# Patient Record
Sex: MALE | Race: BLACK OR AFRICAN AMERICAN | Employment: FULL TIME | URBAN - METROPOLITAN AREA
[De-identification: names, ages, dates, MRNs, and addresses within clinical notes are randomized per-mention and may not be internally consistent; named-entity substitution may affect disease eponyms.]

---

## 2018-01-23 ENCOUNTER — APPOINTMENT (OUTPATIENT)
Dept: GENERAL RADIOLOGY | Age: 28
End: 2018-01-23
Attending: PHYSICIAN ASSISTANT
Payer: COMMERCIAL

## 2018-01-23 ENCOUNTER — APPOINTMENT (OUTPATIENT)
Dept: ULTRASOUND IMAGING | Age: 28
End: 2018-01-23
Attending: PHYSICIAN ASSISTANT
Payer: COMMERCIAL

## 2018-01-23 ENCOUNTER — HOSPITAL ENCOUNTER (EMERGENCY)
Age: 28
Discharge: HOME | End: 2018-01-23
Attending: INTERNAL MEDICINE
Payer: COMMERCIAL

## 2018-01-23 ENCOUNTER — APPOINTMENT (OUTPATIENT)
Dept: CT IMAGING | Age: 28
End: 2018-01-23
Attending: INTERNAL MEDICINE
Payer: COMMERCIAL

## 2018-01-23 VITALS
OXYGEN SATURATION: 100 % | WEIGHT: 227 LBS | SYSTOLIC BLOOD PRESSURE: 121 MMHG | DIASTOLIC BLOOD PRESSURE: 71 MMHG | RESPIRATION RATE: 16 BRPM | TEMPERATURE: 98.3 F | HEIGHT: 69 IN | HEART RATE: 84 BPM | BODY MASS INDEX: 33.62 KG/M2

## 2018-01-23 DIAGNOSIS — R79.89 ELEVATED LFTS: ICD-10-CM

## 2018-01-23 DIAGNOSIS — R51.9 NONINTRACTABLE HEADACHE, UNSPECIFIED CHRONICITY PATTERN, UNSPECIFIED HEADACHE TYPE: Primary | ICD-10-CM

## 2018-01-23 DIAGNOSIS — R50.9 FEVER, UNSPECIFIED FEVER CAUSE: ICD-10-CM

## 2018-01-23 LAB
ALBUMIN SERPL-MCNC: 3.8 G/DL (ref 3.4–5)
ALBUMIN/GLOB SERPL: 0.8 {RATIO} (ref 0.8–1.7)
ALP SERPL-CCNC: 129 U/L (ref 45–117)
ALT SERPL-CCNC: 112 U/L (ref 16–61)
ANION GAP SERPL CALC-SCNC: 12 MMOL/L (ref 3–18)
APPEARANCE UR: CLEAR
AST SERPL-CCNC: 73 U/L (ref 15–37)
ATRIAL RATE: 108 BPM
BASOPHILS # BLD: 0.1 K/UL (ref 0–0.06)
BASOPHILS NFR BLD: 1 % (ref 0–2)
BILIRUB SERPL-MCNC: 0.7 MG/DL (ref 0.2–1)
BILIRUB UR QL: NEGATIVE
BUN SERPL-MCNC: 9 MG/DL (ref 7–18)
BUN/CREAT SERPL: 6 (ref 12–20)
CALCIUM SERPL-MCNC: 9.6 MG/DL (ref 8.5–10.1)
CALCULATED P AXIS, ECG09: 36 DEGREES
CALCULATED R AXIS, ECG10: 9 DEGREES
CALCULATED T AXIS, ECG11: -2 DEGREES
CHLORIDE SERPL-SCNC: 101 MMOL/L (ref 100–108)
CO2 SERPL-SCNC: 28 MMOL/L (ref 21–32)
COLOR UR: YELLOW
CREAT SERPL-MCNC: 1.48 MG/DL (ref 0.6–1.3)
DIAGNOSIS, 93000: NORMAL
DIFFERENTIAL METHOD BLD: ABNORMAL
EOSINOPHIL # BLD: 0.1 K/UL (ref 0–0.4)
EOSINOPHIL NFR BLD: 1 % (ref 0–5)
ERYTHROCYTE [DISTWIDTH] IN BLOOD BY AUTOMATED COUNT: 13.6 % (ref 11.6–14.5)
FLUAV AG NPH QL IA: NEGATIVE
FLUBV AG NOSE QL IA: NEGATIVE
GLOBULIN SER CALC-MCNC: 4.5 G/DL (ref 2–4)
GLUCOSE SERPL-MCNC: 104 MG/DL (ref 74–99)
GLUCOSE UR STRIP.AUTO-MCNC: NEGATIVE MG/DL
HCT VFR BLD AUTO: 42.3 % (ref 36–48)
HGB BLD-MCNC: 14.1 G/DL (ref 13–16)
HGB UR QL STRIP: NEGATIVE
KETONES UR QL STRIP.AUTO: ABNORMAL MG/DL
LACTATE SERPL-SCNC: 0.8 MMOL/L (ref 0.4–2)
LEUKOCYTE ESTERASE UR QL STRIP.AUTO: NEGATIVE
LIPASE SERPL-CCNC: 172 U/L (ref 73–393)
LYMPHOCYTES # BLD: 3.6 K/UL (ref 0.9–3.6)
LYMPHOCYTES NFR BLD: 57 % (ref 21–52)
MCH RBC QN AUTO: 28 PG (ref 24–34)
MCHC RBC AUTO-ENTMCNC: 33.3 G/DL (ref 31–37)
MCV RBC AUTO: 84.1 FL (ref 74–97)
MONOCYTES # BLD: 0.5 K/UL (ref 0.05–1.2)
MONOCYTES NFR BLD: 7 % (ref 3–10)
NEUTS SEG # BLD: 2.1 K/UL (ref 1.8–8)
NEUTS SEG NFR BLD: 34 % (ref 40–73)
NITRITE UR QL STRIP.AUTO: NEGATIVE
P-R INTERVAL, ECG05: 174 MS
PH UR STRIP: 5.5 [PH] (ref 5–8)
PLATELET # BLD AUTO: 242 K/UL (ref 135–420)
PMV BLD AUTO: 8.6 FL (ref 9.2–11.8)
POTASSIUM SERPL-SCNC: 3.8 MMOL/L (ref 3.5–5.5)
PROT SERPL-MCNC: 8.3 G/DL (ref 6.4–8.2)
PROT UR STRIP-MCNC: NEGATIVE MG/DL
Q-T INTERVAL, ECG07: 318 MS
QRS DURATION, ECG06: 84 MS
QTC CALCULATION (BEZET), ECG08: 426 MS
RBC # BLD AUTO: 5.03 M/UL (ref 4.7–5.5)
SODIUM SERPL-SCNC: 141 MMOL/L (ref 136–145)
SP GR UR REFRACTOMETRY: 1.02 (ref 1–1.03)
UROBILINOGEN UR QL STRIP.AUTO: 1 EU/DL (ref 0.2–1)
VENTRICULAR RATE, ECG03: 108 BPM
WBC # BLD AUTO: 6.3 K/UL (ref 4.6–13.2)

## 2018-01-23 PROCEDURE — 85025 COMPLETE CBC W/AUTO DIFF WBC: CPT

## 2018-01-23 PROCEDURE — 96374 THER/PROPH/DIAG INJ IV PUSH: CPT

## 2018-01-23 PROCEDURE — 80053 COMPREHEN METABOLIC PANEL: CPT

## 2018-01-23 PROCEDURE — 83605 ASSAY OF LACTIC ACID: CPT

## 2018-01-23 PROCEDURE — 87804 INFLUENZA ASSAY W/OPTIC: CPT | Performed by: PHYSICIAN ASSISTANT

## 2018-01-23 PROCEDURE — 87040 BLOOD CULTURE FOR BACTERIA: CPT

## 2018-01-23 PROCEDURE — 74011636320 HC RX REV CODE- 636/320: Performed by: INTERNAL MEDICINE

## 2018-01-23 PROCEDURE — 76705 ECHO EXAM OF ABDOMEN: CPT

## 2018-01-23 PROCEDURE — 74011250636 HC RX REV CODE- 250/636: Performed by: PHYSICIAN ASSISTANT

## 2018-01-23 PROCEDURE — 87081 CULTURE SCREEN ONLY: CPT | Performed by: INTERNAL MEDICINE

## 2018-01-23 PROCEDURE — 99285 EMERGENCY DEPT VISIT HI MDM: CPT

## 2018-01-23 PROCEDURE — 83690 ASSAY OF LIPASE: CPT | Performed by: PHYSICIAN ASSISTANT

## 2018-01-23 PROCEDURE — 96361 HYDRATE IV INFUSION ADD-ON: CPT

## 2018-01-23 PROCEDURE — 74011250637 HC RX REV CODE- 250/637: Performed by: PHYSICIAN ASSISTANT

## 2018-01-23 PROCEDURE — 87086 URINE CULTURE/COLONY COUNT: CPT | Performed by: PHYSICIAN ASSISTANT

## 2018-01-23 PROCEDURE — 80074 ACUTE HEPATITIS PANEL: CPT | Performed by: PHYSICIAN ASSISTANT

## 2018-01-23 PROCEDURE — 71046 X-RAY EXAM CHEST 2 VIEWS: CPT

## 2018-01-23 PROCEDURE — 74011000250 HC RX REV CODE- 250: Performed by: PHYSICIAN ASSISTANT

## 2018-01-23 PROCEDURE — 70470 CT HEAD/BRAIN W/O & W/DYE: CPT

## 2018-01-23 PROCEDURE — 86618 LYME DISEASE ANTIBODY: CPT | Performed by: PHYSICIAN ASSISTANT

## 2018-01-23 PROCEDURE — 93005 ELECTROCARDIOGRAM TRACING: CPT

## 2018-01-23 PROCEDURE — 81003 URINALYSIS AUTO W/O SCOPE: CPT

## 2018-01-23 RX ORDER — OSELTAMIVIR PHOSPHATE 75 MG/1
75 CAPSULE ORAL 2 TIMES DAILY
Qty: 10 CAP | Refills: 0 | Status: SHIPPED | OUTPATIENT
Start: 2018-01-23 | End: 2018-01-28

## 2018-01-23 RX ORDER — SODIUM CHLORIDE 0.9 % (FLUSH) 0.9 %
5-10 SYRINGE (ML) INJECTION AS NEEDED
Status: DISCONTINUED | OUTPATIENT
Start: 2018-01-23 | End: 2018-01-24 | Stop reason: HOSPADM

## 2018-01-23 RX ORDER — IBUPROFEN 400 MG/1
800 TABLET ORAL
Status: COMPLETED | OUTPATIENT
Start: 2018-01-23 | End: 2018-01-23

## 2018-01-23 RX ORDER — IBUPROFEN 800 MG/1
800 TABLET ORAL
Qty: 20 TAB | Refills: 0 | Status: SHIPPED | OUTPATIENT
Start: 2018-01-23 | End: 2018-01-30

## 2018-01-23 RX ADMIN — IOPAMIDOL 100 ML: 612 INJECTION, SOLUTION INTRAVENOUS at 19:30

## 2018-01-23 RX ADMIN — WATER 2 G: 1 INJECTION INTRAMUSCULAR; INTRAVENOUS; SUBCUTANEOUS at 18:34

## 2018-01-23 RX ADMIN — SODIUM CHLORIDE 3090 ML: 900 INJECTION, SOLUTION INTRAVENOUS at 16:43

## 2018-01-23 RX ADMIN — IBUPROFEN 800 MG: 400 TABLET, FILM COATED ORAL at 16:38

## 2018-01-23 NOTE — ED NOTES
Rights verified. Pt was medicated as ordered. IVF hung. Pt co 2-3/10 headache. Pt denies any further issues other than joint pain. Pt is able to bow his head,  Denies stiff neck. Pt complains of some nasal congestion. No nausea. Skin is normal, warm and dry.

## 2018-01-23 NOTE — ED TRIAGE NOTES
Patient was seen at Urgent Care on the 13 and treated with amoxicillin. Patient states that he has a headache today and has the chills. Patient was in [facility] over [place] and they said there was a possibility it was meningitis. Sepsis Screening completed    (  )Patient meets SIRS criteria. ( x )Patient does not meet SIRS criteria.       SIRS Criteria is achieved when two or more of the following are present   Temperature < 96.8°F (36°C) or > 100.9°F (38.3°C)   Heart Rate > 90 beats per minute   Respiratory Rate > 20 breaths per minute   WBC count > 12,000 or <4,000 or > 10% bands

## 2018-01-23 NOTE — ED PROVIDER NOTES
EMERGENCY DEPARTMENT HISTORY AND PHYSICAL EXAM    Date: 1/23/2018  Patient Name: Babara Rubinstein    History of Presenting Illness     Chief Complaint   Patient presents with    Headache         History Provided By: Patient    Chief Complaint: Headache  Duration: 3 Weeks  Timing:  Intermittent, worsening  Location: Frontal lobe  Severity: 3 out of 10  Associated Symptoms: dry cough, fever (102.8F), resolved sore throat    Additional History (Context):   5:04 PM  Babara Rubinstein is a 29 y.o. male with Hx of recent travel in [facility] (12/15-1/1) who presents to the emergency department C/O an intermittent frontal headache, onset 3 weeks ago. Associated sxs include dry cough, fever (102.8F), resolved sore throat. Pt states that he tried Tylenol and Motrin without relief. Pt reports going to [facility] earlier this week where he received a course of Amoxicillin, which he completed and temporarily relieved the HA. Pt states that he was tested for HIV 4 years ago with no concern for HIV at the time; he was simply asked by his fiance \"just to be safe. \" Pt denies neck pain, neck stiffness, N/V/D, abd pain, urinary sxs, sore throat, congestion, rhino, ear pain, medical problems, daily meds and any other sxs or complaints. PCP: None        Past History     Past Medical History:  History reviewed. No pertinent past medical history. Past Surgical History:  History reviewed. No pertinent surgical history. Family History:  History reviewed. No pertinent family history. Social History:  Social History   Substance Use Topics    Smoking status: Never Smoker    Smokeless tobacco: Never Used    Alcohol use Yes       Allergies:  No Known Allergies      Review of Systems   Review of Systems   Constitutional: Positive for fever (102.8F). HENT: Negative for congestion, ear pain and rhinorrhea. Gastrointestinal: Negative for abdominal pain, diarrhea, nausea and vomiting.    Genitourinary: Negative for dysuria, frequency, hematuria and urgency. Musculoskeletal: Negative for neck pain and neck stiffness. Neurological: Positive for headaches. All other systems reviewed and are negative. Physical Exam     Vitals:    01/23/18 2104 01/23/18 2130 01/23/18 2200 01/23/18 2247   BP:  116/63 116/61 121/71   Pulse:  84 91 84   Resp:   17 16   Temp: 98.4 °F (36.9 °C)   98.3 °F (36.8 °C)   SpO2:  98% 100% 100%   Weight:       Height:         Physical Exam   Constitutional: He is oriented to person, place, and time. Vital signs are normal. He appears well-developed and well-nourished. No distress. Alert, skin warm to touch, talkative, non toxic appearance    HENT:   Head: Normocephalic and atraumatic. Right Ear: Tympanic membrane and external ear normal.   Left Ear: Tympanic membrane and external ear normal.   Nose: Mucosal edema present. Mouth/Throat: Uvula is midline, oropharynx is clear and moist and mucous membranes are normal. No oropharyngeal exudate, posterior oropharyngeal edema or posterior oropharyngeal erythema. Eyes: Conjunctivae and EOM are normal. Pupils are equal, round, and reactive to light. Neck: Normal range of motion. Neck supple. No Brudzinski's sign and no Kernig's sign noted. Cardiovascular: Normal rate, regular rhythm, normal heart sounds and intact distal pulses. No murmur heard. Pulmonary/Chest: Effort normal and breath sounds normal. No stridor. No respiratory distress. He has no wheezes. He has no rales. Lungs CTA-B    Abdominal: Soft. Bowel sounds are normal. He exhibits no distension. There is no hepatosplenomegaly. There is tenderness (mild) in the right upper quadrant. There is no rigidity, no rebound, no guarding and no CVA tenderness. No guarding or rigidity    Musculoskeletal: Normal range of motion. Lymphadenopathy:     He has no cervical adenopathy. Neurological: He is alert and oriented to person, place, and time. Skin: Skin is warm and dry.  He is not diaphoretic. Psychiatric: He has a normal mood and affect. His behavior is normal. Judgment normal.   Nursing note and vitals reviewed. Diagnostic Study Results     Labs -     No results found for this or any previous visit (from the past 12 hour(s)). Radiologic Studies -   CT HEAD W WO CONT   Final Result      US GALLBLADDER   Final Result  IMPRESSION:     No evidence of acute cholecystitis. Gallbladder is contracted even though the  patient has been nothing by mouth for 6 hours  As read by radiology. XR CHEST PA LAT   Final Result        CT Results  (Last 48 hours)               01/23/18 1943  CT HEAD W WO CONT Final result    Impression:  IMPRESSION:       No acute intracranial abnormalities. There is normal enhancement. Narrative:  EXAM: CT head with and without contrast       INDICATION: Fever, headaches rule out abscess       COMPARISON: None. TECHNIQUE: Axial CT imaging of the head was performed with and without   intravenous contrast.       DOSE REDUCTION:  One or more dose reduction techniques were used on this CT:   automated exposure control, adjustment of the mAs and/or kVp according to   patient's size, and iterative reconstruction techniques. The specific techniques   utilized on this CT exam have been documented in the patient's electronic   medical record.       _______________       FINDINGS:       BRAIN AND POSTERIOR FOSSA: The sulci, folia, ventricles and basal cisterns are   within normal limits for the patient?s age. There is no intracranial hemorrhage,   mass effect, or midline shift. There are no areas of abnormal parenchymal   attenuation. EXTRA-AXIAL SPACES AND MENINGES: There are no abnormal extra-axial fluid   collections. CALVARIUM: Intact. SINUSES: Clear.        OTHER: There is normal enhancement.       _______________               CXR Results  (Last 48 hours)               01/23/18 1456  XR CHEST PA LAT Final result    Impression: IMPRESSION:       No acute cardiopulmonary disease. Narrative:  CHEST PA AND LATERAL       Indication:  Cough. Comparison: None. Findings: The lungs appear clear. No pneumothorax. Cardiac silhouette and   pulmonary vascularity are within normal limits. Costophrenic angles are sharp. Medications given in the ED-  Medications   ibuprofen (MOTRIN) tablet 800 mg (800 mg Oral Given 1/23/18 1638)   sodium chloride 0.9 % bolus infusion 3,090 mL (0 mL/kg × 103 kg IntraVENous IV Completed 1/23/18 2132)   cefTRIAXone (ROCEPHIN) 2 g in sterile water (preservative free) 20 mL IV syringe (2 g IntraVENous Given 1/23/18 1834)   iopamidol (ISOVUE 300) 61 % contrast injection 100 mL (100 mL IntraVENous Given 1/23/18 1930)         Medical Decision Making   I am the first provider for this patient. I reviewed the vital signs, available nursing notes, past medical history, past surgical history, family history and social history. Vital Signs-Reviewed the patient's vital signs. Records Reviewed: Nursing Notes     EKG interpretation: (Preliminary)  7:35 PM   Sinus tachycardia at 108 bpm. Nonspecific T wave abnormality. No ST elevation. EKG read by Brandon Quarles PA-C at 7:15 PM    Provider Notes (Medical Decision Making):   Sepsis, URI, flu, strep, pneumonia, dehydration, electrolyte imbalance, cholecystitis, cholelithiasis, hepatitis, pancreatitis, doubt meningitis     Procedures:  Procedures    ED Course:   5:04 PM   Initial assessment performed. The patients presenting problems have been discussed, and they are in agreement with the care plan formulated and outlined with them. I have encouraged them to ask questions as they arise throughout their visit. CONSULT NOTE:   5:22 PM  Brandon Quarles PA-C spoke with Hillary Mcfadden MD   Specialty: Emergency medicine  Discussed pt's hx, disposition, and available diagnostic and imaging results  in person. Reviewed care plans.  Consulting physician agrees with plans as outlined. Recommends treating pt empirically with 2.0 g Rocefin. CONSULT NOTE:   7:14 PM  Spring Willson PA-C spoke with Mariya Eduardo. Sherrill Rios MD   Specialty: Emergency Medicine  Discussed pt's hx, disposition, and available diagnostic and imaging results  in person. Reviewed care plans. Consulting physician agrees with plans as outlined. Will see pt. FACE-TO-FACE PROGRESS NOTE:  Mariya Eduardo. Sherrill Rios MD was requested to see pt by the KATELIN. Evaluated pt face-to-face. Pt is nontoxic appearing. No meningeal signs. CT unremarkable. Neuro exam not focal. Appropriate for outpatient discharge. Written by Veda Delgado, ED Scribe, as dictated by Mariya Eduardo. Sherrill Rios MD.    Discussion:  Pt presents with vague sxs of intermittent frontal headache, sore throat and dry cough x couple weeks with fever since yesterday. Recently treated with Amox which he completed. Pt initially met SIRS and bundle initiated. Empirically treated with 2 grams Rocephin. No neck pain or meningeal signs, FROM of neck. HA frontal with gradual onset. Doubt meningitis. No leukocytosis and normal lactic. Pt has mild RUQ tenderness on exam with elevated LFT. Gallbladder US normal. Hepatitis panel pending. All other labs WNL. Head CT unremarkable. Suspect viral illness. Doubt pericarditis, no EKG changes, CXR normal, normal cardiac silhouette. Lymes test pending. Flu test normal however given presentation and labs will cover with Tamiflu. Fever and HR responded well with fever control and IV hydration. Suspect viral illness. Strict return precautions, worsening headache, neck pain/stiffness, persistent fever, N/V/D, abd pain or any other new or worsening symptoms. Pt understands and agrees with plan. Needs recheck of blood work to follow LFTs. Diagnosis and Disposition       DISCHARGE NOTE:  9:49 PM  Christie Chavarria's  results have been reviewed with him.   He has been counseled regarding his diagnosis, treatment, and plan. He verbally conveys understanding and agreement of the signs, symptoms, diagnosis, treatment and prognosis and additionally agrees to follow up as discussed. He also agrees with the care-plan and conveys that all of his questions have been answered. I have also provided discharge instructions for him that include: educational information regarding their diagnosis and treatment, and list of reasons why they would want to return to the ED prior to their follow-up appointment, should his condition change. He has been provided with education for proper emergency department utilization. CLINICAL IMPRESSION:    1. Nonintractable headache, unspecified chronicity pattern, unspecified headache type    2. Fever, unspecified fever cause    3. Elevated LFTs        PLAN:  1. D/C Home  2. Discharge Medication List as of 1/23/2018  9:48 PM      START taking these medications    Details   oseltamivir (TAMIFLU) 75 mg capsule Take 1 Cap by mouth two (2) times a day for 5 days. , Print, Disp-10 Cap, R-0      ibuprofen (MOTRIN) 800 mg tablet Take 1 Tab by mouth every six (6) hours as needed for Pain for up to 7 days. , Print, Disp-20 Tab, R-0           3. Follow-up Information     Follow up With Details Comments Contact Info    [facility] Schedule an appointment as soon as possible for a visit in 2 days Follow up with [facility] [address] [facility], [address]    [facility] EMERGENCY DEPT Go to As needed, If symptoms worsen [address] [place] [address]  [phone]        _______________________________    Attestations: This note is prepared by Fernando Childs, acting as Scribe for Brandon uQarles PA-C. Brandon Quarles PA-C:  The scribe's documentation has been prepared under my direction and personally reviewed by me in its entirety.   I confirm that the note above accurately reflects all work, treatment, procedures, and medical decision making performed by me. This note is prepared by Ashok Castano, acting as Scribe for Kin Dang. Munir Francis MD.    Kin Dang. Munir Francis MD: The scribe's documentation has been prepared under my direction and personally reviewed by me in its entirety.  I confirm that the note above accurately reflects all work, treatment, procedures, and medical decision making performed by me.   _______________________________

## 2018-01-23 NOTE — ED NOTES
Pt states headache is better. 1/10. Pt is alert, oriented, with no s/s of acute distress. Skin is warm and dry.   Pt complains of feeling chills

## 2018-01-24 LAB
HAV IGM SER QL: NEGATIVE
HBV CORE IGM SER QL: NEGATIVE
HBV SURFACE AG SER QL: <0.1 INDEX
HBV SURFACE AG SER QL: NEGATIVE
HCV AB SER IA-ACNC: 0.08 INDEX
HCV AB SERPL QL IA: NEGATIVE
HCV COMMENT,HCGAC: NORMAL
SP1: NORMAL
SP2: NORMAL
SP3: NORMAL

## 2018-01-24 NOTE — ED NOTES
Pt is alert, oriented. Pt is sitting at bedside attached to cardiac monitor and bp cuffs. Pt denies pain, but states he has less than 1/10 headache. Skin is normal for race, warm and dry. Pt denies dizziness or any further issues at this time.

## 2018-01-24 NOTE — DISCHARGE INSTRUCTIONS

## 2018-01-25 LAB
B BURGDOR IGM SER IA-ACNC: <0.8 INDEX (ref 0–0.79)
B-HEM STREP THROAT QL CULT: NEGATIVE
B-HEM STREP THROAT QL CULT: NORMAL
BACTERIA SPEC CULT: NORMAL
BACTERIA SPEC CULT: NORMAL
SERVICE CMNT-IMP: NORMAL
SERVICE CMNT-IMP: NORMAL

## 2018-01-29 LAB
BACTERIA SPEC CULT: NORMAL
SERVICE CMNT-IMP: NORMAL